# Patient Record
Sex: FEMALE | Race: WHITE | URBAN - METROPOLITAN AREA
[De-identification: names, ages, dates, MRNs, and addresses within clinical notes are randomized per-mention and may not be internally consistent; named-entity substitution may affect disease eponyms.]

---

## 2024-07-08 ENCOUNTER — OFFICE VISIT (OUTPATIENT)
Dept: ORTHOPEDIC SURGERY | Age: 64
End: 2024-07-08
Payer: COMMERCIAL

## 2024-07-08 VITALS — WEIGHT: 147 LBS | BODY MASS INDEX: 25.1 KG/M2 | HEIGHT: 64 IN

## 2024-07-08 DIAGNOSIS — M72.2 PLANTAR FASCIITIS: ICD-10-CM

## 2024-07-08 DIAGNOSIS — M79.671 PAIN OF RIGHT HEEL: Primary | ICD-10-CM

## 2024-07-08 PROCEDURE — 99203 OFFICE O/P NEW LOW 30 MIN: CPT | Performed by: ORTHOPAEDIC SURGERY

## 2024-07-08 PROCEDURE — M5015 MISC AIRCAST AIRHEEL: HCPCS | Performed by: ORTHOPAEDIC SURGERY

## 2024-07-08 RX ORDER — HYDROXYZINE PAMOATE 25 MG/1
25 CAPSULE ORAL NIGHTLY PRN
COMMUNITY
Start: 2024-01-18 | End: 2025-01-12

## 2024-07-08 RX ORDER — IBUPROFEN 200 MG
200 TABLET ORAL EVERY 6 HOURS PRN
COMMUNITY

## 2024-07-08 RX ORDER — CLOTRIMAZOLE AND BETAMETHASONE DIPROPIONATE 10; .64 MG/G; MG/G
CREAM TOPICAL 2 TIMES DAILY
COMMUNITY
Start: 2024-01-18 | End: 2025-01-17

## 2024-07-08 RX ORDER — ONDANSETRON 4 MG/1
TABLET, ORALLY DISINTEGRATING ORAL
COMMUNITY
Start: 2024-04-18

## 2024-07-08 NOTE — PROGRESS NOTES
Patient was fitted and instructed on an [employer]ircast Air Heel for the right heel. Patient read and signed documenting they understand and agree to [facility]'s current DME return policy.

## 2024-07-08 NOTE — PROGRESS NOTES
Name: Priscila Cruz  YOB: 1960  Gender: female  MRN: [id number]    CC: Right heel pain    HPI:   April 2024: Right heel pain: No trauma: Insurance PT exercises: Dr. Azra calhoun:  07/08/2024: Initial visit: Right heel pain    ROS/Meds/PSH/PMH/FH/SH: only reviewed pertinent/relevant information today    Tobacco:  reports that she has never smoked. She has never used smokeless tobacco.     Physical Examination:  Patient appears to be alert and oriented with acceptable appearance.  No obvious distress or SOB  CV: appears to have acceptable vascular color and capillary refill  Neuro: appears to have mostly intact light touch sensation   Skin: Right = no gross soft tissue swelling; heel fat pad appears intact  MS: Standing: Plantigrade: Gait full  Right = plantar medial heel/plantar fascial pain; no gross deficiency but suspected partial tear  Right = 1st branch lateral plantar nerve/Baxters nerve pain  Right = no lateral or posterior tuberosity pain; no Achilles pain or deficiency    XR: Right: Standing AP lateral mortise ankle plus AP oblique foot taken today with no acute pathology appreciated; no plantar or posterior heel enthesopathic concerns  XR Impression:  As above      Reviewed Test/Records/Documents: Nonapplicable  Injection: Hold    Assessment:    Right plantar fasciitis, 1st branch lateral plantar nerve entrapment    Plan:   The patient and I discussed the above assessment. We explored treatment options.     Hopefully with treatment outlined today she can gradually resolve her symptoms    Advanced medical imaging: Right ankle/hindfoot MRI scan: No indication today  DME: Air heel  DME medical necessity:  Air heel is necessary to provide structural stability and protect/stabilize/support the injured/weakened plantar fascia from further damage/injury      We discussed care, weight lifting and exercise activity protection  PT: Before formal PT, instructed on: HEP Achilles

## 2024-08-27 ENCOUNTER — OFFICE VISIT (OUTPATIENT)
Dept: ORTHOPEDIC SURGERY | Age: 64
End: 2024-08-27
Payer: COMMERCIAL

## 2024-08-27 DIAGNOSIS — M72.2 PLANTAR FASCIITIS: ICD-10-CM

## 2024-08-27 DIAGNOSIS — M79.671 PAIN OF RIGHT HEEL: Primary | ICD-10-CM

## 2024-08-27 PROCEDURE — 20550 NJX 1 TENDON SHEATH/LIGAMENT: CPT | Performed by: ORTHOPAEDIC SURGERY

## 2024-08-27 PROCEDURE — 99212 OFFICE O/P EST SF 10 MIN: CPT | Performed by: ORTHOPAEDIC SURGERY

## 2024-08-27 RX ORDER — METHYLPREDNISOLONE ACETATE 40 MG/ML
40 INJECTION, SUSPENSION INTRA-ARTICULAR; INTRALESIONAL; INTRAMUSCULAR; SOFT TISSUE ONCE
Status: COMPLETED | OUTPATIENT
Start: 2024-08-27 | End: 2024-08-27

## 2024-08-27 RX ADMIN — METHYLPREDNISOLONE ACETATE 40 MG: 40 INJECTION, SUSPENSION INTRA-ARTICULAR; INTRALESIONAL; INTRAMUSCULAR; SOFT TISSUE at 09:01

## 2024-08-27 NOTE — PROGRESS NOTES
Name: Priscila Cruz  YOB: 1960  Gender: female  MRN: [id number]    08/27/2024: Better but not pain-free    HPI:   April 2024: Right heel pain: No trauma: Insurance PT exercises: Dr. Oquendo insoles:  07/08/2024: Initial visit: Right heel pain    ROS/Meds/PSH/PMH/FH/SH: only reviewed pertinent/relevant information today    Tobacco:  reports that she has never smoked. She has never used smokeless tobacco.     Physical Examination:  Patient appears to be alert and oriented with acceptable appearance.  No obvious distress or SOB  CV: appears to have acceptable vascular color and capillary refill  Neuro: appears to have mostly intact light touch sensation   Skin: Right = no gross soft tissue swelling; heel fat pad appears intact  MS: Standing: Plantigrade: Gait full  Right = plantar medial heel/plantar fascial pain; no gross deficiency but suspected partial tear  Right = less 1st branch lateral plantar nerve/Baxters nerve pain  Right = no lateral or posterior tuberosity pain; no Achilles pain or deficiency    XR: Right: Standing AP lateral mortise ankle plus AP oblique foot taken 07/08/2024 with no acute pathology appreciated; no plantar or posterior heel enthesopathic concerns  XR Impression:  As above      Reviewed Test/Records/Documents: Not applicable    Timeout: I confirmed with the patient and Renae :   All are in agreement with: Correct patient, planned procedure, procedure site and laterality: Right plantar fascia  Injection: We discussed risks/complications of injection: After acceptance and sterile prep: Right plantar fascia injected 1 cc Xylocaine, 40 mg Depo-Medrol; she did well    Assessment:    Right plantar fasciitis, 1st branch lateral plantar nerve entrapment    Plan:   The patient and I discussed the above assessment. We explored treatment options.     07/08/2024: Notations:  Air heel  HEP Achilles stretching  Recommended Altra or Hoka shoes for walking: OOFOS shoe at home  We discussed

## 2025-06-05 NOTE — PROGRESS NOTES
Name: Priscila Cruz  YOB: 1960  Gender: female  MRN: [id number]     CC: Foot pain    HPI:   March/April 2025: Right, Left heel tingling burning pain    06/09/2025: New conditions: Bilateral heel/arch pain    ROS/Meds/PSH/PMH/FH/SH: Only reviewed pertinent/relevant information      Tobacco:  reports that she has never smoked. She has never used smokeless tobacco.     Reviewed Test/Records/Documents:   April 2024: Right heel pain: No trauma: Insurance PT exercises: Dr. Oquendo insoles:  07/08/2024: Initial visit: Right heel pain    07/08/2024: Notations:  Diagnosis: Left plantar fasciitis, 1st branch lateral plantar nerve entrapment  Air heel  HEP Achilles stretching  Recommended Altra or Hoka shoes for walking: OOFOS shoe at home  We discussed activity modification especially weight lifting and exercise    08/27/2024: Notations:  Diagnosis: Right plantar fasciitis,1st branch lateral plantar nerve entrapment   She has made progress but not pain-free  She has an upcoming out of country trip so decision made today for injection  08/27/2024: Right plantar fascia injected 1 cc Xylocaine, 40 mg Depo-Medrol; she did well  She understands importance of continuing foot care, weight lifting and exercise activity protection  PT: Before formal PT, continue HEP Achilles stretching  Orthotic/prosthetic: Before custom insoles: She has both Hoka shoes and OOFOS sandals        01/20/2025: Marci Burgos: Hypothyroidisms: Elevated Glucose: Anemia history: Vit. D deficiency:      ---------------------------------------------------------------------------------------------     Physical Examination:  Patient appears to be alert and oriented with acceptable appearance.  No obvious distress or SOB  CV: Both LE = acceptable appearing vascular flow, color, capillary refill   Neuro: Both LE = appear to have intact light touch sensation   Skin: Both LE = no evidence of soft tissue swelling  MS: Standing: Plantigrade: Gait

## 2025-06-09 ENCOUNTER — OFFICE VISIT (OUTPATIENT)
Dept: ORTHOPEDIC SURGERY | Age: 65
End: 2025-06-09
Payer: COMMERCIAL

## 2025-06-09 DIAGNOSIS — M79.672 LEFT FOOT PAIN: ICD-10-CM

## 2025-06-09 DIAGNOSIS — M72.2 PLANTAR FASCIITIS: ICD-10-CM

## 2025-06-09 DIAGNOSIS — M79.671 RIGHT FOOT PAIN: Primary | ICD-10-CM

## 2025-06-09 DIAGNOSIS — M79.2 FOOT NEURALGIA: ICD-10-CM

## 2025-06-09 PROCEDURE — 20550 NJX 1 TENDON SHEATH/LIGAMENT: CPT | Performed by: ORTHOPAEDIC SURGERY

## 2025-06-09 PROCEDURE — 99214 OFFICE O/P EST MOD 30 MIN: CPT | Performed by: ORTHOPAEDIC SURGERY

## 2025-06-09 RX ORDER — METHYLPREDNISOLONE ACETATE 40 MG/ML
40 INJECTION, SUSPENSION INTRA-ARTICULAR; INTRALESIONAL; INTRAMUSCULAR; SOFT TISSUE ONCE
Status: COMPLETED | OUTPATIENT
Start: 2025-06-09 | End: 2025-06-09

## 2025-06-09 RX ORDER — PROGESTERONE 200 MG/1
CAPSULE ORAL
COMMUNITY

## 2025-06-09 RX ORDER — GABAPENTIN 100 MG/1
CAPSULE ORAL
Qty: 90 CAPSULE | Refills: 2 | Status: SHIPPED | OUTPATIENT
Start: 2025-06-09 | End: 2025-09-09

## 2025-06-09 RX ORDER — ASPIRIN 81 MG
1 TABLET, DELAYED RELEASE (ENTERIC COATED) ORAL DAILY
COMMUNITY

## 2025-06-09 RX ORDER — BIOTIN 5 MG
1 TABLET ORAL DAILY
COMMUNITY

## 2025-06-09 RX ADMIN — METHYLPREDNISOLONE ACETATE 40 MG: 40 INJECTION, SUSPENSION INTRA-ARTICULAR; INTRALESIONAL; INTRAMUSCULAR; SOFT TISSUE at 15:11

## 2025-09-02 ENCOUNTER — OFFICE VISIT (OUTPATIENT)
Dept: ORTHOPEDIC SURGERY | Age: 65
End: 2025-09-02
Payer: COMMERCIAL

## 2025-09-02 DIAGNOSIS — M25.571 ACUTE RIGHT ANKLE PAIN: ICD-10-CM

## 2025-09-02 DIAGNOSIS — S82.839A AVULSION FRACTURE OF DISTAL FIBULA: Primary | ICD-10-CM

## 2025-09-02 PROCEDURE — 99214 OFFICE O/P EST MOD 30 MIN: CPT | Performed by: PHYSICIAN ASSISTANT
